# Patient Record
Sex: FEMALE | Race: WHITE | NOT HISPANIC OR LATINO | URBAN - METROPOLITAN AREA
[De-identification: names, ages, dates, MRNs, and addresses within clinical notes are randomized per-mention and may not be internally consistent; named-entity substitution may affect disease eponyms.]

---
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
## 2018-01-15 ENCOUNTER — RX ONLY (RX ONLY)
Age: 18
End: 2018-01-15

## 2018-01-15 RX ORDER — CLINDAMYCIN PHOSPHATE AND TRETINOIN 10; .25 MG/G; MG/G
1 APPLICATION GEL TOPICAL NIGHTLY
Qty: 60 | Refills: 3
Start: 2018-01-15

## 2018-10-02 ENCOUNTER — SKIN CANCER EXAM (OUTPATIENT)
Dept: URBAN - METROPOLITAN AREA CLINIC 36 | Facility: CLINIC | Age: 18
Setting detail: DERMATOLOGY
End: 2018-10-02

## 2018-10-02 PROCEDURE — 11100 BX SKIN SUBCUTANEOUS&/MUCOUS MEMBRANE 1 LESION: CPT

## 2018-10-02 PROCEDURE — 99214 OFFICE O/P EST MOD 30 MIN: CPT

## 2018-10-02 PROCEDURE — 11101 BIOPSY SKIN SUBQ&/MUCOUS MEMBRANE EA ADDL LESION: CPT

## 2022-10-24 ENCOUNTER — OFFICE VISIT (OUTPATIENT)
Dept: ORTHOPEDIC SURGERY | Age: 22
End: 2022-10-24
Payer: COMMERCIAL

## 2022-10-24 VITALS — RESPIRATION RATE: 16 BRPM | BODY MASS INDEX: 21.98 KG/M2 | WEIGHT: 145 LBS | HEIGHT: 68 IN

## 2022-10-24 DIAGNOSIS — R20.0 HAND NUMBNESS: Primary | ICD-10-CM

## 2022-10-24 PROCEDURE — 99203 OFFICE O/P NEW LOW 30 MIN: CPT | Performed by: PHYSICIAN ASSISTANT

## 2022-10-24 PROCEDURE — L3908 WHO COCK-UP NONMOLDE PRE OTS: HCPCS | Performed by: PHYSICIAN ASSISTANT

## 2022-10-24 RX ORDER — SPIRONOLACTONE 100 MG/1
TABLET, FILM COATED ORAL
COMMUNITY
Start: 2022-08-17

## 2022-10-24 NOTE — PROGRESS NOTES
Ms. Sindy Cordova is a 23 y.o. right handed woman  who is seen today in Hand Surgical Consultation at the request of No primary care provider on file. .    She presents today regarding Bilateral symptoms, right greater than left which have been present for approximately 4 weeks. A history of antecedent trauma or injury is Absent. She reports symptoms to include moderate numbness & tingling in the Whole Hand. Hand symptoms do Frequently awaken her from sleep. She reports no pain located in the palmar both wrists. Symptoms are worsening over time. Previous treatment has included conservative measures and splinting of the right wrist.  She does not claim relation of her symptoms to her required work activities. She has not undergone electrodiagnostic testing. I have today reviewed with Sindy Cordova the clinically relevant, past medical history, medications, allergies,  family history, social history, and Review Of Systems & I have documented any details relevant to today's presenting complaints in my history above. Ms. Ivan Ovalle's self-reported past medical history, medications, allergies,  family history, social history, and Review Of Systems have been scanned into the chart under the \"Media\" tab. Physical Exam:  Ms. Ivan Ovalle's most recent vitals:  Vitals  Resp: 16  Height: 5' 8\" (172.7 cm)  Weight: 145 lb (65.8 kg)    She is well nourished, oriented to person, place & time. She demonstrates appropriate mood and affect as well as normal gait and station. Skin: Normal in appearance, Normal Color, and Free of Lesions Bilaterally   Digital range of motion is without significant limitation bilaterally  Wrist range of motion is without significant limitation bilaterally  There is no evidence of gross joint instability bilaterally. Sensation is subjectively tingling in the Whole Hand on the Right, greater than Left and objectively present in the same distribution bilaterally.   Vascular examination reveals normal, good capillary refill, and good color bilaterally  Swelling is absent in the distal volar forearm bilaterally  Muscular strength is clinically appropriate bilaterally. Examination for Carpal Tunnel Syndrome shows Carpal Tunnel Compression Test to be Mildly Positive on the right & Mildly Positive on the left. The patient displays mild baseline symptoms to potentially confound the exam.  The thenar musculature is not atrophied & weakened. Examination for Stenosing Tenosynovitis demonstrates no evidence of tenderness, thickening or nodularity at the A-1 pulleys of the digits bilaterally. There no palpable triggering or any finger or thumb. Impression:  Ms. Ashtyn Chew has developed Carpal Tunnel Syndrome, which is currently exacerbated, and presents requesting further treatment. Plan:    I have had a thorough discussion with Ms. Ashtyn Chew regarding the treatment options available for her initially presenting bilateral carpal tunnel syndrome, which is causing her significant symptoms and difficulty. I have outlined for Ms. Ashtyn Chew the risk, benefits and consequences of the various treatment modalities, including a reasonable expectation for the long term success of each. We have discussed the likelihood that further, more aggressive treatment may be required for her current presenting condition. Based upon our current discussion and a reasonable understating of the options available to her, Ms. Ashtyn Chew has selected to proceed with a conservative plan of treatment consisting of: the use of protective splints, activity modification, and the judicious use of over-the-counter anti-inflammatory medications if allowed by her primary care physician. An appropriately sized Removable Carpal Tunnel Splint was provided. Instructions were given regarding splint use and wear as well as suggestions for use of the other modalities were discussed.   I have clearly explained to her that the above outlined treatment plan should not be expected to 'cure' her carpal tunnel syndrome, but we are rather treating the symptoms with which she presents. She has understood that in order to achieve more durable relief of her symptoms and to prevent future worsening or further damage, that definitive surgical treatment would be required. Ms. Devi Christian  voiced an appropriate understanding of our discussion, the options available to her, and of the expectations of her selected  treatment. Procedures    [place] Titan Wrist Short Brace     Patient was prescribed a [place] Titan Wrist Orthosis. The left wrist will require stabilization / immobilization from this semi-rigid / rigid orthosis to improve their function. The orthosis will assist in protecting the affected area, provide functional support and facilitate healing. The patient was educated and fit by a healthcare professional with expert knowledge and specialization in brace application while under the direct supervision of the treating physician. Verbal and written instructions for the use of and application of this item were provided. They were instructed to contact the office immediately should the brace result in increased pain, decreased sensation, increased swelling or worsening of the condition. [place] [place] Titan Wrist Short Brace     Patient was prescribed a [place] [place] Titan Wrist Orthosis. The right wrist will require stabilization / immobilization from this semi-rigid / rigid orthosis to improve their function. The orthosis will assist in protecting the affected area, provide functional support and facilitate healing. The patient was educated and fit by a healthcare professional with expert knowledge and specialization in brace application while under the direct supervision of the treating physician. Verbal and written instructions for the use of and application of this item were provided. They were instructed to contact the office immediately should the brace result in increased pain, decreased sensation, increased swelling or worsening of the condition. \    I have also discussed with Ms. Lesley Mccormick  the other treatment options available to her  for this condition. We have today selected to proceed with conservative management. She and I have agreed that if our current course of conservative treatment does not prove to be effective over the short term future, that she will schedule a follow-up appointment to discuss and select an alternate course of therapy including possibly injection or surgical treatment. Ms. Lesley Mccormick has been given a full verbal list of instructions and precautions related to her present condition. I have asked her to followup with me in the office at the prescribed time. She is also specifically requested to call or return to the office sooner if her symptoms change or worsen prior to the next scheduled appointment.

## 2022-10-24 NOTE — PATIENT INSTRUCTIONS
EMG    What is an EMG? EMG stands for electromyography and is one part of an electrodiagnostic study. An electrodiagnostic study consists of the electromyography as well as nerve conduction studies. An electrodiagnostic study determines how well your peripheral nervous system is working and helps determine if there is evidence of:                                                                     Nerve compression, e.g. from carpal tunnel syndrome. Peripheral neuropathy, e.g. from diabetes mellitus                                   Nerve root compression, e.g. from a lumbar disc herniation                                   Generalized muscle dysfunction    An Electrodiagnostic study is performed by applying skin electrodes and small needle like pins to the affected limb, stimulating the nerve and muscle with a small amount of electricity, and recording on a screen the measurements of the integrity of the nerve and response of the muscle tested to the electrical stimulus. Mild discomfort during the test may occur during insertion of the thin needle electrodes through the skin and during electrical stimulation of the nerve. The more you are able to relax during the procedure, the less discomfort you will experience. Special Considerations    There are virtually no side effects. It is possible to get small bruises at sites where the pins are inserted through the skin. However, these bruises resolve quickly. Scheduling    Pre-authorization is required on all workers comp cases. This process may take 4-6 weeks. Once the test is authorized you will be notified to set a date and time when you will be able to have the test done. Preparation    There is essentially no preparation required.  However, please do not apply any lotion to the skin of the body part to be examined, as this may interfere with the examiners ability to perform a complete and accurate test. You may follow your normal routine after the examination.  You may drive yourself to and from the examination     Testing Procedures  Please allow approximately 20 minutes for this test    After Test    The results of the test will be sent to your referring physician approximately one week after the testing procedure has been performed

## 2022-11-01 ENCOUNTER — TELEPHONE (OUTPATIENT)
Dept: ORTHOPEDIC SURGERY | Age: 22
End: 2022-11-01

## 2022-11-01 NOTE — TELEPHONE ENCOUNTER
Faxing Patient Information     Facility Name: [address] [place] [address] Name: Satnam Esquivel  Contact Number: [phone]  Facility Fax Number: [phone]    [address] FAXED TO THEM FOR HER TO SEE LANCE MARCELINO